# Patient Record
Sex: MALE | Race: BLACK OR AFRICAN AMERICAN | NOT HISPANIC OR LATINO | URBAN - METROPOLITAN AREA
[De-identification: names, ages, dates, MRNs, and addresses within clinical notes are randomized per-mention and may not be internally consistent; named-entity substitution may affect disease eponyms.]

---

## 2017-11-12 ENCOUNTER — EMERGENCY (EMERGENCY)
Facility: HOSPITAL | Age: 37
LOS: 1 days | Discharge: HOME | End: 2017-11-12
Attending: EMERGENCY MEDICINE | Admitting: EMERGENCY MEDICINE
Payer: OTHER MISCELLANEOUS

## 2017-11-12 VITALS
TEMPERATURE: 98 F | HEART RATE: 67 BPM | RESPIRATION RATE: 16 BRPM | DIASTOLIC BLOOD PRESSURE: 78 MMHG | OXYGEN SATURATION: 100 % | SYSTOLIC BLOOD PRESSURE: 145 MMHG

## 2017-11-12 PROCEDURE — 12001 RPR S/N/AX/GEN/TRNK 2.5CM/<: CPT

## 2017-11-12 PROCEDURE — 99283 EMERGENCY DEPT VISIT LOW MDM: CPT | Mod: 25

## 2017-11-12 PROCEDURE — 99053 MED SERV 10PM-8AM 24 HR FAC: CPT

## 2017-11-12 NOTE — ED ADULT TRIAGE NOTE - CHIEF COMPLAINT QUOTE
Pt arrives w/ c/o laceration to left hand pinky finger after being cut by bin while moving it at work. Arrives w/ band aid in place to finger.

## 2017-11-13 RX ORDER — IBUPROFEN 200 MG
600 TABLET ORAL ONCE
Qty: 0 | Refills: 0 | Status: COMPLETED | OUTPATIENT
Start: 2017-11-13 | End: 2017-11-13

## 2017-11-13 RX ORDER — TETANUS TOXOID, REDUCED DIPHTHERIA TOXOID AND ACELLULAR PERTUSSIS VACCINE, ADSORBED 5; 2.5; 8; 8; 2.5 [IU]/.5ML; [IU]/.5ML; UG/.5ML; UG/.5ML; UG/.5ML
0.5 SUSPENSION INTRAMUSCULAR ONCE
Qty: 0 | Refills: 0 | Status: COMPLETED | OUTPATIENT
Start: 2017-11-13 | End: 2017-11-13

## 2017-11-13 RX ADMIN — TETANUS TOXOID, REDUCED DIPHTHERIA TOXOID AND ACELLULAR PERTUSSIS VACCINE, ADSORBED 0.5 MILLILITER(S): 5; 2.5; 8; 8; 2.5 SUSPENSION INTRAMUSCULAR at 01:03

## 2017-11-13 RX ADMIN — Medication 600 MILLIGRAM(S): at 02:26

## 2017-11-13 NOTE — ED PROVIDER NOTE - OBJECTIVE STATEMENT
laceration to left 5tyh digit at work on dumpster, unknown last tetenus  3 sutures 37M no anticoagulation p/w laceration to left 5th digit tip which occurred at work on a sharp dumpster edge PTA. Unknown last tetanus.

## 2017-11-21 ENCOUNTER — EMERGENCY (EMERGENCY)
Facility: HOSPITAL | Age: 37
LOS: 1 days | Discharge: HOME | End: 2017-11-21
Attending: EMERGENCY MEDICINE | Admitting: EMERGENCY MEDICINE

## 2017-11-21 VITALS
SYSTOLIC BLOOD PRESSURE: 127 MMHG | RESPIRATION RATE: 20 BRPM | HEART RATE: 62 BPM | DIASTOLIC BLOOD PRESSURE: 67 MMHG | OXYGEN SATURATION: 100 % | TEMPERATURE: 98 F

## 2017-11-21 NOTE — ED PROVIDER NOTE - MEDICAL DECISION MAKING DETAILS
Cipriano PGY1: unlikely any complication from suture placement. no signs of infection or bleeding. sutures removed, steristrips in place. will dc with follow up with PMD

## 2017-11-21 NOTE — ED PROVIDER NOTE - OBJECTIVE STATEMENT
36yo M with no PMH presents for suture removal. 8 days ago, struck his hand against corner of garbage can, had 1.5cm lac to palmar aspect of 5th digit on lateral side, had 3 sutures, told to return. no fevers, chills, no signs of infection or bleeding. had tetanus 8days ago.

## 2017-11-21 NOTE — ED PROVIDER NOTE - ATTENDING CONTRIBUTION TO CARE
CLARK Montgomery: I have personally performed a face to face bedside history and physical examination of this patient. I have discussed the history, examination, review of systems, assessment and plan of management with the resident. I have reviewed the electronic medical record and amended it to reflect my history, review of systems, physical exam, assessment and plan.    37m presents with L. lateral fifth digit lac for suture removal, sutures placed 8 d ago, no difficulty ranging, no redness/swelling/drainage/numbness.   exam  GEN - NAD; well appearing; A+O x3   HEAD - NC/AT   EYES- PERRL, EOMI  ENT: Airway patent, mmm.  NECK: Neck supple, non-tender  BACK - Normal  spine   EXTREMITIES - FROM symmetric pulses, capillary refill < 2 seconds, no cyanosis, no edema   SKIN - 1.5cm lac on volar aspect of 5th distal phalanx, no drainage, no surrounding erythema, induration, fluctuance, nv intact  NEUROLOGIC - alert, speech clear, sensation intact, full strength  PSYCH -nl mood/affect, nl insight.  a/p-37m here for wound check and suture removal, no e/o infection, healing well, sutures removed, steri-strips placed, dc home with pmd f/u. CLARK Montgomery: I have personally performed a face to face bedside history and physical examination of this patient. I have discussed the history, examination, review of systems, assessment and plan of management with the resident. I have reviewed the electronic medical record and amended it to reflect my history, review of systems, physical exam, assessment and plan.    37m presents with L. lateral fifth digit lac for suture removal, sutures placed 8 d ago, no difficulty ranging, no redness/swelling/drainage/numbness.   exam  GEN - NAD; well appearing; A+O x3   HEAD - NC/AT   EYES- PERRL, EOMI  ENT: Airway patent, mmm.  EXTREMITIES - FROM symmetric pulses, capillary refill < 2 seconds, no cyanosis, no edema   SKIN - 1.5cm lac on volar aspect of 5th distal phalanx, no drainage, no surrounding erythema, induration, fluctuance, nv intact  NEUROLOGIC - alert, speech clear, sensation intact, full strength  PSYCH -nl mood/affect, nl insight.  a/p-37m here for wound check and suture removal, no e/o infection, healing well, sutures removed, steri-strips placed, dc home with pmd f/u.

## 2017-11-21 NOTE — ED PROVIDER NOTE - PHYSICAL EXAMINATION
Gen: Well appearing, NAD  Head: NCAT  HEENT: PERRL, MMM, normal conjunctiva, anicteric, neck supple  Lung: CTAB, no adventitious sounds  CV: RRR, no murmurs, rubs or gallops  Abd: soft, NTND, no rebound or guarding, no CVAT  MSK: No edema, 1.5cm lac on palmar aspect of 5th finger on L, onlateral aspect  Neuro: No focal neurologic deficits. CN II-XII grossly intact. 5/5 strength and normal sensation in all extremities.  Skin: Warm and dry, no evidence of rash  Psych: normal mood and affect